# Patient Record
Sex: FEMALE | Race: OTHER | NOT HISPANIC OR LATINO
[De-identification: names, ages, dates, MRNs, and addresses within clinical notes are randomized per-mention and may not be internally consistent; named-entity substitution may affect disease eponyms.]

---

## 2021-01-11 ENCOUNTER — TRANSCRIPTION ENCOUNTER (OUTPATIENT)
Age: 61
End: 2021-01-11

## 2021-02-08 ENCOUNTER — TRANSCRIPTION ENCOUNTER (OUTPATIENT)
Age: 61
End: 2021-02-08

## 2022-09-26 PROBLEM — Z00.00 ENCOUNTER FOR PREVENTIVE HEALTH EXAMINATION: Status: ACTIVE | Noted: 2022-09-26

## 2022-11-07 ENCOUNTER — APPOINTMENT (OUTPATIENT)
Dept: OTOLARYNGOLOGY | Facility: CLINIC | Age: 62
End: 2022-11-07

## 2022-11-07 ENCOUNTER — NON-APPOINTMENT (OUTPATIENT)
Age: 62
End: 2022-11-07

## 2022-11-07 VITALS
HEIGHT: 66 IN | WEIGHT: 150 LBS | BODY MASS INDEX: 24.11 KG/M2 | HEART RATE: 73 BPM | DIASTOLIC BLOOD PRESSURE: 75 MMHG | SYSTOLIC BLOOD PRESSURE: 112 MMHG

## 2022-11-07 DIAGNOSIS — R06.83 SNORING: ICD-10-CM

## 2022-11-07 DIAGNOSIS — H90.3 SENSORINEURAL HEARING LOSS, BILATERAL: ICD-10-CM

## 2022-11-07 DIAGNOSIS — H93.293 OTHER ABNORMAL AUDITORY PERCEPTIONS, BILATERAL: ICD-10-CM

## 2022-11-07 PROCEDURE — 92557 COMPREHENSIVE HEARING TEST: CPT

## 2022-11-07 PROCEDURE — 99204 OFFICE O/P NEW MOD 45 MIN: CPT

## 2022-11-07 PROCEDURE — 92570 ACOUSTIC IMMITANCE TESTING: CPT

## 2022-11-07 RX ORDER — OMEPRAZOLE 40 MG/1
40 CAPSULE, DELAYED RELEASE ORAL
Qty: 30 | Refills: 0 | Status: ACTIVE | COMMUNITY
Start: 2022-11-05

## 2022-11-07 RX ORDER — DIAZEPAM 5 MG/1
5 TABLET ORAL
Qty: 30 | Refills: 0 | Status: ACTIVE | COMMUNITY
Start: 2022-05-31

## 2022-11-07 NOTE — HISTORY OF PRESENT ILLNESS
[de-identified] : 62 yr old female c/o hearing loss and has difficulty understanding for a few months\par -otalgia, tinnitus, dizzy\par \par +ear plugs at night\par -hx otitis, noise exp, head trauma\par +FH mother (infections)\par \par +snoring w occ nighttime awakening after loud snore\par +daytime fatigue\par 5'6"\par 150lb (stable)\par +seasonal allergy\par -sinusitis

## 2022-11-07 NOTE — ASSESSMENT
[FreeTextEntry1] :  WNL to mild SNHL w type A AU\par HAE\par annual audio\par \par sleep study\par f/u after testing is complete

## 2022-11-07 NOTE — REVIEW OF SYSTEMS
[Heartburn] : heartburn [Joint Pain] : joint pain [Negative] : Heme/Lymph [As Noted in HPI] : as noted in HPI

## 2022-11-07 NOTE — PHYSICAL EXAM
[] : septum deviated to the left [Normal] : mucosa is normal [Midline] : trachea located in midline position [de-identified] : enlarged [de-identified] : 1+

## 2022-11-11 ENCOUNTER — APPOINTMENT (OUTPATIENT)
Dept: PULMONOLOGY | Facility: CLINIC | Age: 62
End: 2022-11-11

## 2022-11-11 ENCOUNTER — OUTPATIENT (OUTPATIENT)
Dept: OUTPATIENT SERVICES | Facility: HOSPITAL | Age: 62
LOS: 1 days | End: 2022-11-11
Payer: COMMERCIAL

## 2022-11-11 DIAGNOSIS — G47.33 OBSTRUCTIVE SLEEP APNEA (ADULT) (PEDIATRIC): ICD-10-CM

## 2022-11-11 PROCEDURE — 95800 SLP STDY UNATTENDED: CPT

## 2022-11-15 ENCOUNTER — NON-APPOINTMENT (OUTPATIENT)
Age: 62
End: 2022-11-15

## 2022-11-21 ENCOUNTER — APPOINTMENT (OUTPATIENT)
Dept: OTOLARYNGOLOGY | Facility: CLINIC | Age: 62
End: 2022-11-21

## 2022-11-21 VITALS
BODY MASS INDEX: 25.23 KG/M2 | HEART RATE: 77 BPM | HEIGHT: 66 IN | DIASTOLIC BLOOD PRESSURE: 77 MMHG | WEIGHT: 157 LBS | SYSTOLIC BLOOD PRESSURE: 123 MMHG

## 2022-11-21 DIAGNOSIS — G47.33 OBSTRUCTIVE SLEEP APNEA (ADULT) (PEDIATRIC): ICD-10-CM

## 2022-11-21 PROCEDURE — 99214 OFFICE O/P EST MOD 30 MIN: CPT

## 2022-11-21 NOTE — REVIEW OF SYSTEMS
[As Noted in HPI] : as noted in HPI [Heartburn] : heartburn [Joint Pain] : joint pain [Negative] : Heme/Lymph

## 2022-11-21 NOTE — PHYSICAL EXAM
[] : septum deviated to the left [Normal] : mucosa is normal [Midline] : trachea located in midline position [de-identified] : enlarged [de-identified] : 1+

## 2022-11-21 NOTE — HISTORY OF PRESENT ILLNESS
[de-identified] : 62 yr old female  seen withe her  snoring w occ nighttime awakening after loud snore\par +daytime fatigue\par 5'6"\par 150lb (stable)\par +seasonal allergy\par -sinusitis\par \par comes in today for results of sleep study

## 2022-12-01 ENCOUNTER — APPOINTMENT (OUTPATIENT)
Dept: PHARMACY | Facility: CLINIC | Age: 62
End: 2022-12-01

## 2022-12-01 PROCEDURE — V5010 ASSESSMENT FOR HEARING AID: CPT | Mod: NC

## 2022-12-06 ENCOUNTER — APPOINTMENT (OUTPATIENT)
Dept: SLEEP CENTER | Facility: CLINIC | Age: 62
End: 2022-12-06